# Patient Record
Sex: FEMALE | Race: WHITE
[De-identification: names, ages, dates, MRNs, and addresses within clinical notes are randomized per-mention and may not be internally consistent; named-entity substitution may affect disease eponyms.]

---

## 2021-01-12 ENCOUNTER — HOSPITAL ENCOUNTER (EMERGENCY)
Dept: HOSPITAL 57 - BURERS | Age: 76
Discharge: HOME | End: 2021-01-12
Payer: MEDICARE
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
## 2021-01-12 DIAGNOSIS — S42.252A: Primary | ICD-10-CM

## 2021-01-12 DIAGNOSIS — E78.5: ICD-10-CM

## 2021-01-12 DIAGNOSIS — E78.00: ICD-10-CM

## 2021-01-12 DIAGNOSIS — E11.9: ICD-10-CM

## 2021-01-12 DIAGNOSIS — Z79.899: ICD-10-CM

## 2021-01-12 DIAGNOSIS — I10: ICD-10-CM

## 2021-01-12 LAB
ALBUMIN SERPL BCG-MCNC: 4.5 G/DL (ref 3.4–4.8)
ALP SERPL-CCNC: 110 U/L (ref 40–110)
ALT SERPL W P-5'-P-CCNC: 17 U/L (ref 8–55)
ANION GAP SERPL CALC-SCNC: 17 MMOL/L (ref 10–20)
AST SERPL-CCNC: 16 U/L (ref 5–34)
BASOPHILS # BLD AUTO: 0 THOU/UL (ref 0–0.2)
BASOPHILS NFR BLD AUTO: 0.7 % (ref 0–1)
BILIRUB SERPL-MCNC: 0.5 MG/DL (ref 0.2–1.2)
BUN SERPL-MCNC: 26 MG/DL (ref 9.8–20.1)
CALCIUM SERPL-MCNC: 9.9 MG/DL (ref 7.8–10.44)
CHLORIDE SERPL-SCNC: 102 MMOL/L (ref 98–107)
CO2 SERPL-SCNC: 25 MMOL/L (ref 23–31)
CREAT CL PREDICTED SERPL C-G-VRATE: 0 ML/MIN (ref 70–130)
EOSINOPHIL # BLD AUTO: 0 THOU/UL (ref 0–0.7)
EOSINOPHIL NFR BLD AUTO: 0.7 % (ref 0–10)
GLOBULIN SER CALC-MCNC: 3.1 G/DL (ref 2.4–3.5)
GLUCOSE SERPL-MCNC: 158 MG/DL (ref 83–110)
HGB BLD-MCNC: 11.8 G/DL (ref 12–16)
LYMPHOCYTES # BLD AUTO: 1.9 THOU/UL (ref 1.2–3.4)
LYMPHOCYTES NFR BLD AUTO: 27.7 % (ref 21–51)
MCH RBC QN AUTO: 31.2 PG (ref 27–31)
MCV RBC AUTO: 96.1 FL (ref 78–98)
MONOCYTES # BLD AUTO: 0.5 THOU/UL (ref 0.11–0.59)
MONOCYTES NFR BLD AUTO: 8.1 % (ref 0–10)
NEUTROPHILS # BLD AUTO: 4.2 THOU/UL (ref 1.4–6.5)
NEUTROPHILS NFR BLD AUTO: 62.8 % (ref 42–75)
PLATELET # BLD AUTO: 209 THOU/UL (ref 130–400)
POTASSIUM SERPL-SCNC: 4.5 MMOL/L (ref 3.5–5.1)
RBC # BLD AUTO: 3.79 MILL/UL (ref 4.2–5.4)
SODIUM SERPL-SCNC: 139 MMOL/L (ref 136–145)
WBC # BLD AUTO: 6.7 THOU/UL (ref 4.8–10.8)

## 2021-01-12 PROCEDURE — 84484 ASSAY OF TROPONIN QUANT: CPT

## 2021-01-12 PROCEDURE — 80053 COMPREHEN METABOLIC PANEL: CPT

## 2021-01-12 PROCEDURE — 36415 COLL VENOUS BLD VENIPUNCTURE: CPT

## 2021-01-12 PROCEDURE — 85025 COMPLETE CBC W/AUTO DIFF WBC: CPT

## 2021-01-12 PROCEDURE — 70450 CT HEAD/BRAIN W/O DYE: CPT

## 2021-01-12 PROCEDURE — 96374 THER/PROPH/DIAG INJ IV PUSH: CPT

## 2021-01-12 PROCEDURE — 96375 TX/PRO/DX INJ NEW DRUG ADDON: CPT

## 2021-01-12 PROCEDURE — 71045 X-RAY EXAM CHEST 1 VIEW: CPT

## 2021-01-12 PROCEDURE — 93005 ELECTROCARDIOGRAM TRACING: CPT

## 2021-01-12 NOTE — RAD
LEFT SHOULDER THREE VIEWS:

1/12/21

 

There is a fracture of at least the greater tubercle with a shearing off of the tubercle and slight d
isplacement. One of the views raised the question about the humeral neck, but I cannot confirm damage
 here as of yet. The AC joint shows arthritic change but no offset. The scapula appears intact, as do
 the visible adjacent ribs.

 

IMPRESSION: 

Fracture of at least the greater tubercle of the humerus. There is no dislocation. 

 

POS: HOME

## 2021-01-12 NOTE — RAD
PORTABLE CHEST:

1/12/21

 

An AP portable film at 1518 is presented. The heart is normal in size. While the mediastinum seems sl
ightly wide, I believe this is a function of the portable AP technique and angle it is shot at. There
 is no deviation of the trachea. The lungs are fully inflated and clear. There is no sign of pleural 
effusion. 

 

IMPRESSION: 

No acute thoracic finding. 

 

POS: HOME

## 2021-01-12 NOTE — CT
CT OF THE BRAIN WITHOUT CONTRAST:

1/12/21

 

The ventricles are normal in size with no shift. No intracranial bleeding or extra-axial hematoma was
 seen. There is rather profound frontal or bifrontal atrophy. No mass, edema or stroke was found. The
 skull appears intact and the visible paranasal sinuses are clear. 

 

IMPRESSION: 

No acute intracranial findings. 

 

Preliminary report called to Dr. Yanez at 1530 on 1/12/21.

 

POS: HOME

## 2021-01-12 NOTE — RAD
LEFT ANKLE THREE VIEWS:

1/12/21

 

No fracture was seen. All bones appeared intact.  Large calcaneal spur was seen. 

 

IMPRESSION: 

No acute bony finding. 

 

POS: HOME